# Patient Record
Sex: MALE | Race: WHITE | Employment: OTHER | URBAN - METROPOLITAN AREA
[De-identification: names, ages, dates, MRNs, and addresses within clinical notes are randomized per-mention and may not be internally consistent; named-entity substitution may affect disease eponyms.]

---

## 2018-04-04 ENCOUNTER — HOSPITAL ENCOUNTER (EMERGENCY)
Age: 23
Discharge: HOME | End: 2018-04-04
Attending: EMERGENCY MEDICINE
Payer: OTHER GOVERNMENT

## 2018-04-04 VITALS
OXYGEN SATURATION: 99 % | RESPIRATION RATE: 20 BRPM | WEIGHT: 155 LBS | TEMPERATURE: 97.6 F | BODY MASS INDEX: 21.7 KG/M2 | DIASTOLIC BLOOD PRESSURE: 51 MMHG | HEIGHT: 71 IN | SYSTOLIC BLOOD PRESSURE: 116 MMHG | HEART RATE: 53 BPM

## 2018-04-04 DIAGNOSIS — M62.830 MUSCLE SPASM OF BACK: Primary | ICD-10-CM

## 2018-04-04 PROCEDURE — 99282 EMERGENCY DEPT VISIT SF MDM: CPT

## 2018-04-04 RX ORDER — CARISOPRODOL 350 MG/1
350 TABLET ORAL
Qty: 12 TAB | Refills: 0 | Status: SHIPPED | OUTPATIENT
Start: 2018-04-04

## 2018-04-04 NOTE — DISCHARGE INSTRUCTIONS
Back Spasm: Care Instructions  Your Care Instructions  A back spasm is sudden tightness and pain in your back muscles. It may happen from overuse or an injury. Things like sleeping in an awkward way, bending, lifting, standing, or sitting can sometimes cause a spasm. But the cause isn't always clear. Home treatment includes using heat or ice, taking over-the-counter (OTC) pain medicines, and avoiding activities that may cause back pain. For a back spasm that doesn't get better with home care, your doctor may prescribe medicine. Treatments such as massage or manipulation may also help ease a back spasm. Your doctor may also suggest exercise or physical therapy to help improve strength and flexibility in your back muscles. In most cases, getting back to your normal activities is good foryour back. Just make sure to avoid doing things that make your pain worse. Follow-up care is a key part of your treatment and safety. Be sure to make and go to all appointments, and call your doctor if you are having problems. It's also a good idea to know your test results and keep a list of the medicines you take. How can you care for yourself at home? ? Heat, ice, and medicines  ? · To relieve pain, use heat or ice (whichever feels better) on the affected area. ¨ Put a warm water bottle, a heating pad set on low, or a warm cloth on your back. Put a thin cloth between the heating pad and your skin. Do not go to sleep with a heating pad on your skin. ¨ Try ice or a cold pack on the area for 10 to 20 minutes at a time. Put a thin cloth between the ice and your skin. ? · Ask your doctor if you can take acetaminophen (such as Tylenol) or nonsteroidal anti-inflammatory drugs, such as ibuprofen or naproxen. Your doctor can prescribe stronger medicines if needed. Be safe with medicines. Read and follow all instructions on the label. ?Body positions and posture  ?  · Sit or lie in positions that are most comfortable for you and that reduce pain. Try one of these positions when you lie down:  ¨ Lie on your back with your knees bent and supported by large pillows. ¨ Lie on the floor with your legs on the seat of a sofa or chair. [place]  on your side with your knees and hips bent and a pillow between your legs. ¨ Lie on your stomach if it does not make pain worse. ? · Do not sit up in bed. Avoid soft couches and twisted positions. ? · Avoid bed rest after the first day of back pain. Bed rest can help relieve pain at first, but it delays healing. Continued rest without activity is usually not good for your back. ? · If you must sit for long periods of time, take breaks from sitting. Change positions every 30 minutes. Get up and walk around, or lie in a comfortable position. Activity  ? · Take short walks several times a day. You can start with 5 to 10 minutes, 3 or 4 times a day, and work up to longer walks. Walk on level surfaces and avoid hills and stairs until your back starts to feel better. ? · After your back spasm starts to feel better, try to stretch your muscles every day, especially before and after exercise and at bedtime. Regular stretching can help relax your muscles. ? · To prevent future back pain, do exercises to stretch and strengthen your back and stomach. Learn to use good posture, safe lifting techniques, and other ways to move to help you avoid back pain. When should you call for help? Call 911 anytime you think you may need emergency care. For example, call if:  ? · You are unable to move an arm or a leg at all. ?Call your doctor now or seek immediate medical care if:  ? · You have new or worse symptoms in your legs, belly, or buttocks. Symptoms may include:  ¨ Numbness or tingling. ¨ Weakness. ¨ Pain. ? · You lose bladder or bowel control. ? Watch closely for changes in your health, and be sure to contact your doctor if:  ? · You do not get better as expected. Where can you learn more?   Go to http://ilya-lisa.info/. Enter E232 in the search box to learn more about \"Back Spasm: Care Instructions. \"  Current as of: March 21, 2017  Content Version: 11.4  © 8625-4909 [employer], [employer]. Care instructions adapted under license by [employer] (which disclaims liability or warranty for this information). If you have questions about a medical condition or this instruction, always ask your healthcare professional. [place] [address] any warranty or liability for your use of this information.

## 2018-04-04 NOTE — ED TRIAGE NOTES
Presented to ED to be evaluated for reported right back and shoulder pain with intermittent episodes x 2 weeks with worsening x 1 day. Patient denies any known injury. States pain worse with deep breath      Sepsis Screening completed    (  )Patient meets SIRS criteria. ( x )Patient does not meet SIRS criteria.       SIRS Criteria is achieved when two or more of the following are present   Temperature < 96.8°F (36°C) or > 100.9°F (38.3°C)   Heart Rate > 90 beats per minute   Respiratory Rate > 20 breaths per minute   WBC count > 12,000 or <4,000 or > 10% bands

## 2018-04-04 NOTE — LETTER
[facility] [place] 
[facility] 
[address] [id number] [phone] Work/School Note Date: 4/4/2018 To Whom It May concern: 
 
Amaris Warren was seen and treated today in the emergency room by the following provider(s): 
Physician Assistant: TORIN Cullen. Please allow Amaris Warren to remain in quarters until 4/5/2018 Sincerely, 
 
 
 
 
 
Cristela Phalen, PA-C

## 2018-04-04 NOTE — ED PROVIDER NOTES
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 The pain is positional. Reports receiving a Toradol injection yesterday which has provided no relief. Denies known injury or trigger for his sxs. Reports hx of similar sxs to the left. NKDA. Denies numbness, rash and any other sxs or complaints. PCP: Kindra Pitts MD        Past History     Past Medical History:  History reviewed. No pertinent past medical history. Past Surgical History:  Past Surgical History:   Procedure Laterality Date    HX ORTHOPAEDIC         Family History:  History reviewed. No pertinent family history. Social History:  Social History   Substance Use Topics    Smoking status: Never Smoker    Smokeless tobacco: Never Used    Alcohol use No      Comment: \"occasional\"       Allergies:  No Known Allergies      Review of Systems   Review of Systems   Musculoskeletal: Positive for back pain (right upper back). Skin: Negative for rash. Neurological: Negative for numbness. All other systems reviewed and are negative.       Physical Exam     Vitals:    04/04/18 0614   BP: 116/51   Pulse: (!) 53   Resp: 20   Temp: 97.6 °F (36.4 °C)   SpO2: 99%   Weight: 70.3 kg (155 lb)   Height: 5' 11\" (1.803 m)     Physical Exam   Constitutional: He is oriented to person, place, and time. He appears well-developed and well-nourished. No distress. HENT:   Head: Normocephalic and atraumatic. Eyes: Conjunctivae and EOM are normal. Pupils are equal, round, and reactive to light. Neck: Normal range of motion. Neck supple. Cardiovascular: Normal rate and regular rhythm. Pulmonary/Chest: Effort normal and breath sounds normal.   Musculoskeletal: Normal range of motion. Cervical back: He exhibits no bony tenderness. Thoracic back: He exhibits no bony tenderness. Lumbar back: He exhibits no bony tenderness. Back:    RUE: NVI, FROM  TTP right subscapular area & trapezius regions as shown   Neurological: He is alert and oriented to person, place, and time. Skin: Skin is warm. Psychiatric: He has a normal mood and affect. His behavior is normal.   Nursing note and vitals reviewed. Diagnostic Study Results     Labs -   No results found for this or any previous visit (from the past 12 hour(s)). Radiologic Studies -   No orders to display     CT Results  (Last 48 hours)    None        CXR Results  (Last 48 hours)    None          Medications given in the ED-  Medications - No data to display      Medical Decision Making   I am the first provider for this patient. I reviewed the vital signs, available nursing notes, past medical history, past surgical history, family history and social history. Vital Signs-Reviewed the patient's vital signs. Pulse Oximetry Analysis - 99% on room air     Records Reviewed: Nursing Notes    Procedures:  Procedures    ED Course:   7:14 AM Initial assessment performed. The patients presenting problems have been discussed, and they are in agreement with the care plan formulated and outlined with them. I have encouraged them to ask questions as they arise throughout their visit. Diagnosis and Disposition       DISCHARGE NOTE:  7:34 AM   Bruna Dixon's  results have been reviewed with him.   He has been counseled regarding his diagnosis, treatment, and plan. He verbally conveys understanding and agreement of the signs, symptoms, diagnosis, treatment and prognosis and additionally agrees to follow up as discussed. He also agrees with the care-plan and conveys that all of his questions have been answered. I have also provided discharge instructions for him that include: educational information regarding their diagnosis and treatment, and list of reasons why they would want to return to the ED prior to their follow-up appointment, should his condition change. He has been provided with education for proper emergency department utilization. CLINICAL IMPRESSION:    1. Muscle spasm of back        PLAN:  1. D/C Home  2. Current Discharge Medication List      START taking these medications    Details   carisoprodol (SOMA) 350 mg tablet Take 1 Tab by mouth every eight (8) hours as needed for Muscle Spasm(s). Max Daily Amount: 1,050 mg.  Qty: 12 Tab, Refills: 0    Associated Diagnoses: Muscle spasm of back           3. Follow-up Information     Follow up With Details Comments Contact Info     Schedule an appointment as soon as possible for a visit in 2 days For primary care follow up [phone]    [facility] EMERGENCY DEPT  As needed, If symptoms worsen [address] [place] [facility] [id number]  [phone]        _______________________________    Attestations: This note is prepared by Irene Ortiz, acting as Scribe for ANNALISE Riggs PA-C:  The scribe's documentation has been prepared under my direction and personally reviewed by me in its entirety.   I confirm that the note above accurately reflects all work, treatment, procedures, and medical decision making performed by me.  _______________________________